# Patient Record
(demographics unavailable — no encounter records)

---

## 2024-12-11 NOTE — PHYSICAL EXAM
[Chaperone Present] : A chaperone was present in the examining room during all aspects of the physical examination [05684] : A chaperone was present during the pelvic exam. [FreeTextEntry2] : victoriano matthews [Appropriately responsive] : appropriately responsive [Alert] : alert [No Acute Distress] : no acute distress [No Lymphadenopathy] : no lymphadenopathy [Soft] : soft [Non-tender] : non-tender [Non-distended] : non-distended [No HSM] : No HSM [No Lesions] : no lesions [No Mass] : no mass [Oriented x3] : oriented x3 [Examination Of The Breasts] : a normal appearance [No Masses] : no breast masses were palpable [Labia Majora] : normal [Labia Minora] : normal [Normal] : normal [Uterine Adnexae] : normal

## 2024-12-11 NOTE — COUNSELING
[Nutrition/ Exercise/ Weight Management] : nutrition, exercise, weight management [Vitamins/Supplements] : vitamins/supplements [Breast Self Exam] : breast self exam [Contraception/ Emergency Contraception/ Safe Sexual Practices] : contraception, emergency contraception, safe sexual practices given warm blanket

## 2024-12-11 NOTE — PHYSICAL EXAM
[Chaperone Present] : A chaperone was present in the examining room during all aspects of the physical examination [19051] : A chaperone was present during the pelvic exam. [FreeTextEntry2] : victoriano matthews [Appropriately responsive] : appropriately responsive [Alert] : alert [No Acute Distress] : no acute distress [No Lymphadenopathy] : no lymphadenopathy [Soft] : soft [Non-tender] : non-tender [Non-distended] : non-distended [No HSM] : No HSM [No Lesions] : no lesions [No Mass] : no mass [Oriented x3] : oriented x3 [Examination Of The Breasts] : a normal appearance [No Masses] : no breast masses were palpable [Labia Majora] : normal [Labia Minora] : normal [Normal] : normal [Uterine Adnexae] : normal

## 2024-12-11 NOTE — HISTORY OF PRESENT ILLNESS
[FreeTextEntry1] : flash [Mammogramdate] : 2024 [BreastSonogramDate] : 2024 [PapSmeardate] : 2022 [Currently Active] : currently active [Men] : men [Condoms] : Condoms

## 2025-03-10 NOTE — HISTORY OF PRESENT ILLNESS
[Other Location: e.g. School (Enter Location, City,State)___] : at [unfilled], at the time of the visit. [Other Location: e.g. Home (Enter Location, City,State)___] : at [unfilled] [Telehealth (audio & video)] : This visit was provided via telehealth using real-time 2-way audio visual technology. [Verbal consent obtained from patient] : the patient, [unfilled] [FreeTextEntry1] : new finding on mammo requiring left targeted breast sono to assess.New 7 mm hypoechoic mass that is suspect .Breast bx recommended. Rev procedure with pt--what to expect during and post procedure as well Pt does not palpate abnormality--finding on imaging

## 2025-03-10 NOTE — END OF VISIT
Subjective:       Patient ID: Annita Blunt is a 52 y.o. female.    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  She was in the hospital last week for several days secondary to acute renal failure.  She has significant swelling of the lower extremities from bilateral feet to bilateral thigh.  In April 2021 she was diagnosed with bilateral hydro ureteral nephrosis.  On cystoscopy Dr. Murrieta found the trigone was totally obliterated with a lesion that covered the bilateral ureteral orifice openings.  He removed all the tissue involved on the trigone and unroof both ureteral orifices.  Double-J stents were deployed bilaterally for.  Times subsequently removed in May of 2021. Final path was benign urothelium with evidence of chronic cystitis cystica.  Patient reports that she was passing urine so she quit self cathing sometime ago.  She was told during hospitalization by Nephrology that she was not emptying her bladder completely which caused back up to the high kidneys resulting in worsening of her renal insufficiency.  CT scan December 15, 2021 identified a bladder that was distended extending almost to the level of the umbilicus.  There was diffuse bladder wall thickening which was present but showed interval improvement when compared to previous exams.  She had trace amount of left-sided hydronephrosis with a mild left hydroureter and interval resolution of the right-sided hydronephrosis.  Minimal thickening of the wall of the right mid ureter was also noted.  She presents today with a Blum catheter in place.  It is patent.  No other urologic concerns at this time.       Past Medical History:   Past Medical History:   Diagnosis Date    Anemia     Diabetes mellitus     High cholesterol     Hypertension     Kidney stone        Past Surgical Historical:   Past Surgical History:   Procedure  "Laterality Date    CYSTOSCOPY W/ URETERAL STENT PLACEMENT Left 04/02/2021    CYSTOSCOPY WITH URETEROSCOPY, RETROGRADE PYELOGRAPHY, AND INSERTION OF STENT  04/02/2021    CYSTOURETHROSCOPY Right 04/02/2021    stent     HYSTERECTOMY      SPINAL FUSION          Medications:   Medication List with Changes/Refills   Current Medications    AMLODIPINE-BENAZEPRIL 10-20MG (LOTREL) 10-20 MG PER CAPSULE    Take 1 capsule by mouth once daily.    ATENOLOL (TENORMIN) 25 MG TABLET    Take 25 mg by mouth once daily.    BD ULTRA-FINE SHORT PEN NEEDLE 31 GAUGE X 5/16" NDLE    use as directed    BENAZEPRIL (LOTENSIN) 20 MG TABLET    Take 20 mg by mouth once daily.    CEFUROXIME (CEFTIN) 250 MG TABLET    Take 250 mg by mouth 2 (two) times daily.    CIPROFLOXACIN HCL (CIPRO) 500 MG TABLET    Take 500 mg by mouth every 24 hours as needed.    FUROSEMIDE (LASIX) 40 MG TABLET    Take 40 mg by mouth 2 (two) times daily.    GLIPIZIDE (GLUCOTROL) 2.5 MG TR24    Take 5 mg by mouth daily with breakfast.    HYDRALAZINE (APRESOLINE) 100 MG TABLET    Take 100 mg by mouth 2 (two) times daily.    HYDROCODONE-ACETAMINOPHEN (NORCO) 7.5-325 MG PER TABLET    Take 1 tablet by mouth every 4 (four) hours as needed. for pain.    HYOSCYAMINE (ANASPAZ,LEVSIN) 0.125 MG TAB    Take 250 mcg by mouth every 8 (eight) hours.    LABETALOL (NORMODYNE) 100 MG TABLET    Take 100 mg by mouth 2 (two) times daily.    LANTUS SOLOSTAR U-100 INSULIN GLARGINE 100 UNITS/ML (3ML) SUBQ PEN    INJECT 35 UNITS UNDER THE SKIN EVERY DAY    ROSUVASTATIN (CRESTOR) 20 MG TABLET    Take 20 mg by mouth every evening.    SPIRONOLACTONE (ALDACTONE) 25 MG TABLET    Take 25 mg by mouth once daily.    TRUE METRIX GLUCOSE TEST STRIP STRP    USE TO TEST BLOOD SUGAR THREE TIMES DAILY AS DIRECTED        Past Social History:   Social History     Socioeconomic History    Marital status:    Tobacco Use    Smoking status: Never Smoker    Smokeless tobacco: Never Used   Substance and " Sexual Activity    Alcohol use: Yes       Allergies: Review of patient's allergies indicates:  No Known Allergies     Family History: No family history on file.     Review of Systems:  Review of Systems   Constitutional: Negative for activity change and appetite change.   HENT: Negative for congestion and dental problem.    Respiratory: Negative for chest tightness and shortness of breath.    Cardiovascular: Negative for chest pain.   Gastrointestinal: Negative for abdominal distention and abdominal pain.   Genitourinary: Positive for difficulty urinating. Negative for decreased urine volume, dyspareunia, dysuria, enuresis, flank pain, frequency, genital sores, hematuria, pelvic pain and urgency.   Musculoskeletal: Negative for back pain and neck pain.   Allergic/Immunologic: Negative for immunocompromised state.   Neurological: Negative for dizziness.   Hematological: Negative for adenopathy.   Psychiatric/Behavioral: Negative for agitation, behavioral problems and confusion.       Physical Exam:  Physical Exam  Constitutional:       Appearance: She is well-developed and well-nourished.   HENT:      Head: Normocephalic and atraumatic.   Eyes:      General: No scleral icterus.  Cardiovascular:      Pulses: Intact distal pulses.   Pulmonary:      Effort: Pulmonary effort is normal.      Breath sounds: Normal breath sounds.   Abdominal:      General: There is no distension.      Palpations: Abdomen is soft.      Tenderness: There is no abdominal tenderness.   Genitourinary:     Exam position: Supine.      Labia:         Right: No rash, tenderness or lesion.         Left: No rash, tenderness or lesion.       Vagina: Normal.      Rectum: Normal.   Musculoskeletal:         General: No edema.      Cervical back: Normal range of motion.   Skin:     General: Skin is warm and dry.   Neurological:      Mental Status: She is alert and oriented to person, place, and time.   Psychiatric:         Mood and Affect: Mood and affect  normal.         Assessment/Plan:   Ureteral orifice stricture, bilateral--see HPI above.  In talking with patient were going to schedule a surveillance cystoscopy with Dr. Bowser to ensure were not having progression of her chronic cystitis cystica.  She is in agreement with this.    Chronic renal insufficiency she is up-to-date on labs and this is deferred to her nephrologist    Bilateral lower extremity edema--some improvement with elevation of the legs.  Recommend thigh-high compression stockings.  Rx written.  Will refer to Dr. Briones to ensure no underlying venous insufficiency issues.  This could all be secondary to her chronic renal insufficiency.    Hypotonic bladder--we removed her Blum catheter day patient's request she will resume CIC upon arising in a.m. after 1st void in prior to bedtime.  During daytime she will catheter for 6     On CT scan report of mild pericardial effusion.  She has seen Dr. Mario Ziegler in the past, cardiology.  I have asked her to call ins office and make an appointment for follow-up she verbalized understanding of same  Problem List Items Addressed This Visit    None               [Time Spent: ___ minutes] : I have spent [unfilled] minutes of time on the encounter which excludes teaching and separately reported services.